# Patient Record
Sex: FEMALE | Race: BLACK OR AFRICAN AMERICAN | Employment: FULL TIME | URBAN - METROPOLITAN AREA
[De-identification: names, ages, dates, MRNs, and addresses within clinical notes are randomized per-mention and may not be internally consistent; named-entity substitution may affect disease eponyms.]

---

## 2017-01-03 ENCOUNTER — TELEPHONE (OUTPATIENT)
Dept: PEDIATRICS | Facility: CLINIC | Age: 32
End: 2017-01-03

## 2017-01-03 NOTE — TELEPHONE ENCOUNTER
Patient fell last night onto concrete injurying her ankle.  Noted large amount of swelling last night.  Has been applying ice and elevating leg, and the swelling has improved slightly, but still has significant swelling today.  Ankle is very painful to the touch and is unable to bear weight on the ankle.  Is asking if she should be seen?  Advised that if she is having this much pain, she should be evaluated.  Patient will come to Urgent care for evaluation today.  No further questions.  JULIANA Miller RN

## 2017-03-21 ENCOUNTER — OFFICE VISIT (OUTPATIENT)
Dept: URGENT CARE | Facility: URGENT CARE | Age: 32
End: 2017-03-21
Payer: COMMERCIAL

## 2017-03-21 VITALS
DIASTOLIC BLOOD PRESSURE: 88 MMHG | TEMPERATURE: 97.6 F | SYSTOLIC BLOOD PRESSURE: 122 MMHG | HEART RATE: 74 BPM | WEIGHT: 170.4 LBS | OXYGEN SATURATION: 100 %

## 2017-03-21 DIAGNOSIS — R11.0 NAUSEA: ICD-10-CM

## 2017-03-21 DIAGNOSIS — R42 LIGHTHEADEDNESS: Primary | ICD-10-CM

## 2017-03-21 DIAGNOSIS — R19.7 DIARRHEA, UNSPECIFIED TYPE: ICD-10-CM

## 2017-03-21 LAB
ALBUMIN SERPL-MCNC: 3.7 G/DL (ref 3.4–5)
ALP SERPL-CCNC: 57 U/L (ref 40–150)
ALT SERPL W P-5'-P-CCNC: 16 U/L (ref 0–50)
ANION GAP SERPL CALCULATED.3IONS-SCNC: 7 MMOL/L (ref 3–14)
AST SERPL W P-5'-P-CCNC: 24 U/L (ref 0–45)
BASOPHILS # BLD AUTO: 0 10E9/L (ref 0–0.2)
BASOPHILS NFR BLD AUTO: 0.5 %
BILIRUB SERPL-MCNC: 0.6 MG/DL (ref 0.2–1.3)
BUN SERPL-MCNC: 12 MG/DL (ref 7–30)
CALCIUM SERPL-MCNC: 9 MG/DL (ref 8.5–10.1)
CHLORIDE SERPL-SCNC: 103 MMOL/L (ref 94–109)
CO2 SERPL-SCNC: 28 MMOL/L (ref 20–32)
CREAT SERPL-MCNC: 1 MG/DL (ref 0.52–1.04)
DIFFERENTIAL METHOD BLD: NORMAL
EOSINOPHIL # BLD AUTO: 0.1 10E9/L (ref 0–0.7)
EOSINOPHIL NFR BLD AUTO: 1 %
ERYTHROCYTE [DISTWIDTH] IN BLOOD BY AUTOMATED COUNT: 11.9 % (ref 10–15)
GFR SERPL CREATININE-BSD FRML MDRD: 66 ML/MIN/1.7M2
GLUCOSE SERPL-MCNC: 87 MG/DL (ref 70–99)
HCT VFR BLD AUTO: 38.9 % (ref 35–47)
HGB BLD-MCNC: 12.8 G/DL (ref 11.7–15.7)
LYMPHOCYTES # BLD AUTO: 3.3 10E9/L (ref 0.8–5.3)
LYMPHOCYTES NFR BLD AUTO: 52 %
MCH RBC QN AUTO: 30.4 PG (ref 26.5–33)
MCHC RBC AUTO-ENTMCNC: 32.9 G/DL (ref 31.5–36.5)
MCV RBC AUTO: 92 FL (ref 78–100)
MONOCYTES # BLD AUTO: 0.5 10E9/L (ref 0–1.3)
MONOCYTES NFR BLD AUTO: 8.1 %
NEUTROPHILS # BLD AUTO: 2.4 10E9/L (ref 1.6–8.3)
NEUTROPHILS NFR BLD AUTO: 38.4 %
PLATELET # BLD AUTO: 286 10E9/L (ref 150–450)
POTASSIUM SERPL-SCNC: 4.7 MMOL/L (ref 3.4–5.3)
PROT SERPL-MCNC: 7.3 G/DL (ref 6.8–8.8)
RBC # BLD AUTO: 4.21 10E12/L (ref 3.8–5.2)
SODIUM SERPL-SCNC: 138 MMOL/L (ref 133–144)
WBC # BLD AUTO: 6.3 10E9/L (ref 4–11)

## 2017-03-21 PROCEDURE — 36415 COLL VENOUS BLD VENIPUNCTURE: CPT | Performed by: PHYSICIAN ASSISTANT

## 2017-03-21 PROCEDURE — 85025 COMPLETE CBC W/AUTO DIFF WBC: CPT | Performed by: PHYSICIAN ASSISTANT

## 2017-03-21 PROCEDURE — 96372 THER/PROPH/DIAG INJ SC/IM: CPT | Performed by: PHYSICIAN ASSISTANT

## 2017-03-21 PROCEDURE — 80053 COMPREHEN METABOLIC PANEL: CPT | Performed by: PHYSICIAN ASSISTANT

## 2017-03-21 PROCEDURE — 99203 OFFICE O/P NEW LOW 30 MIN: CPT | Mod: 25 | Performed by: PHYSICIAN ASSISTANT

## 2017-03-21 NOTE — PROGRESS NOTES
SUBJECTIVE:   Joi Choi is a 31 year old female who complains of new onset positional vertigo since this morning. Has not had this in the past. The patient denies any other symptoms of neurological impairment or TIA's; no amaurosis, diplopia, dysphasia, or unilateral disturbance of motor or sensory function. No headaches currently, though had a typical migraine (left sided) yesterday. No hearing loss or tinnitus, nor head injury. No palpitations or syncope. Healthy in the past. Has had diarrhea today x3.  Soft, nonwatery/explosiveNo blood in stool.    OBJECTIVE:  Appears well, in with mild distress. Vitals normal. Ears normal. Neck supple. No adenopathy or masses in the neck or supraclavicular regions. Cranial nerves are normal. SARAH. EOM's intact. Mental status normal. Gait mildly impairedl. Romberg positive with unidirectional instability (backward). Cerebellar function is normal. Pulse regular. Rapid changes in position during the exam do precipitate brief dizziness without nystagmus.    Results for orders placed or performed in visit on 03/21/17   CBC with platelets and differential   Result Value Ref Range    WBC 6.3 4.0 - 11.0 10e9/L    RBC Count 4.21 3.8 - 5.2 10e12/L    Hemoglobin 12.8 11.7 - 15.7 g/dL    Hematocrit 38.9 35.0 - 47.0 %    MCV 92 78 - 100 fl    MCH 30.4 26.5 - 33.0 pg    MCHC 32.9 31.5 - 36.5 g/dL    RDW 11.9 10.0 - 15.0 %    Platelet Count 286 150 - 450 10e9/L    Diff Method Automated Method     % Neutrophils 38.4 %    % Lymphocytes 52.0 %    % Monocytes 8.1 %    % Eosinophils 1.0 %    % Basophils 0.5 %    Absolute Neutrophil 2.4 1.6 - 8.3 10e9/L    Absolute Lymphocytes 3.3 0.8 - 5.3 10e9/L    Absolute Monocytes 0.5 0.0 - 1.3 10e9/L    Absolute Eosinophils 0.1 0.0 - 0.7 10e9/L    Absolute Basophils 0.0 0.0 - 0.2 10e9/L   Comprehensive metabolic panel (BMP + Alb, Alk Phos, ALT, AST, Total. Bili, TP)   Result Value Ref Range    Sodium 138 133 - 144 mmol/L    Potassium 4.7 3.4 - 5.3  mmol/L    Chloride 103 94 - 109 mmol/L    Carbon Dioxide 28 20 - 32 mmol/L    Anion Gap 7 3 - 14 mmol/L    Glucose 87 70 - 99 mg/dL    Urea Nitrogen 12 7 - 30 mg/dL    Creatinine 1.00 0.52 - 1.04 mg/dL    GFR Estimate 66 >60 mL/min/1.7m2    GFR Estimate If Black 79 >60 mL/min/1.7m2    Calcium 9.0 8.5 - 10.1 mg/dL    Bilirubin Total 0.6 0.2 - 1.3 mg/dL    Albumin 3.7 3.4 - 5.0 g/dL    Protein Total 7.3 6.8 - 8.8 g/dL    Alkaline Phosphatase 57 40 - 150 U/L    ALT 16 0 - 50 U/L    AST 24 0 - 45 U/L         ASSESSMENT:  (R42) Lightheadedness  (primary encounter diagnosis)  Comment: likely vestibular neuronitis  Plan: CBC with platelets and differential,         Comprehensive metabolic panel (BMP + Alb, Alk         Phos, ALT, AST, Total. Bili, TP), CANCELED: UA         with Microscopic reflex to Culture, CANCELED:         Beta HCG qual IFA urine  Orthostatics WNL     The patient is reassured that these symptoms do not appear to represent a serious or threatening condition. This is generally a self-limited temporary but uncomfortable situation. Rest, avoid potentially dangerous activities (such as driving or working with machinery or at heights). Asked to call if develops other symptoms, such as alterations of speech, swallowing, vision, motor or sensory systems, or if dizziness persists or worsens.    (R11.0) Nausea  Comment: Mild  Plan: PROMETHAZINE HCL INJ/50MG, INJECTION         INTRAMUSCULAR OR SUB-Q  Follow up with PCP if symptoms worsen or fail to improve        (R19.7) Diarrhea, unspecified type  Plan: Comprehensive metabolic panel (BMP + Alb, Alk         Phos, ALT, AST, Total. Bili, TP) limited diet, increased fluids in small amounts  Follow up with PCP if symptoms worsen or fail to improve

## 2017-03-21 NOTE — PATIENT INSTRUCTIONS
Follow up with your healthcare provider for further evaluation within the next 7 days or as advised    To Emergency Room with worsening of symptoms      Dizziness (Uncertain Cause)  Dizziness is a common symptom. It may be described as lightheadedness, spinning, or feeling like you are going to faint. Dizziness can have many causes.  Be sure to tell the healthcare provider about:    All medicines you take, including prescription, over-the-counter, herbs, and supplements    Any other symptoms you have    Any health problems you are being treated for    Anything that causes the dizziness to get worse or better  Today's exam did not show an exact cause for your dizziness. Other tests may be needed. Follow up with your healthcare provider.  Home care    Dizziness that occurs with sudden standing may be a sign of mild dehydration. Drink extra fluids for the next few days.    If you recently started a new medicine, stopped a medicine, or had the dose of a current medicine changed, talk with the prescribing healthcare provider. Your medicine plan may need adjustment.    If dizziness lasts more than a few seconds, sit or lie down until it passes. This may help prevent injury in case you pass out.    Do not drive or use power tools or dangerous equipment until you have had no dizziness for at least 48 hours.  Follow-up care  Follow up with your healthcare provider for further evaluation within the next 7 days or as advised.  When to seek medical advice  Call your healthcare provider for any of the following:    Worsening of symptoms or new symptoms    Passing out or seizure    Repeated vomiting    Headache    Palpitations (the sense that your heart is fluttering or beating fast or hard)    Shortness of breath    Blood in vomit or stool (black or red color)    Weakness of an arm or leg or one side of the face    Vision or hearing changes    Trouble walking or speaking    Chest, arm, neck, back, or jaw pain       [id number]  [employer]. [address] [place] [address], [place] [address]. All rights reserved. This information is not intended as a substitute for professional medical care. Always follow your healthcare professional's instructions.

## 2017-03-21 NOTE — NURSING NOTE
The following medication was given:     MEDICATION: Phenergan 25mg  ROUTE: IM  SITE: [facility]  DOSE: 25mg  LOT #: [id number]  :  Graine de Cadeaux  EXPIRATION DATE:  08/2018  NDC#: 7226-6671-08  Edelmira Dickens

## 2017-03-21 NOTE — LETTER
[facility]  [address]  [address]  [place] [address]  Phone: [phone]  Fax: [phone]    March 21, 2017        Joi Choi  [address]   [place] [address]          To whom it may concern:    RE: Joi Choi    Patient was seen and treated today at our clinic and missed work.  Please excuse absences on 3/21 and 3/22/17    Please contact me for questions or concerns.      Sincerely,        Trevor Cartagena PA-C

## 2017-03-21 NOTE — MR AVS SNAPSHOT
After Visit Summary   3/21/2017    Joi Choi    MRN: [id number]           Patient Information     Date Of Birth          1985        Visit Information        Provider Department      3/21/2017 12:15 PM Trevor Cartagena PA-C [facility]        Today's Diagnoses     Lightheadedness    -  1    Nausea          Care Instructions      Follow up with your healthcare provider for further evaluation within the next 7 days or as advised    To Emergency Room with worsening of symptoms      Dizziness (Uncertain Cause)  Dizziness is a common symptom. It may be described as lightheadedness, spinning, or feeling like you are going to faint. Dizziness can have many causes.  Be sure to tell the healthcare provider about:    All medicines you take, including prescription, over-the-counter, herbs, and supplements    Any other symptoms you have    Any health problems you are being treated for    Anything that causes the dizziness to get worse or better  Today's exam did not show an exact cause for your dizziness. Other tests may be needed. Follow up with your healthcare provider.  Home care    Dizziness that occurs with sudden standing may be a sign of mild dehydration. Drink extra fluids for the next few days.    If you recently started a new medicine, stopped a medicine, or had the dose of a current medicine changed, talk with the prescribing healthcare provider. Your medicine plan may need adjustment.    If dizziness lasts more than a few seconds, sit or lie down until it passes. This may help prevent injury in case you pass out.    Do not drive or use power tools or dangerous equipment until you have had no dizziness for at least 48 hours.  Follow-up care  Follow up with your healthcare provider for further evaluation within the next 7 days or as advised.  When to seek medical advice  Call your healthcare provider for any of the following:    Worsening of symptoms or new  "symptoms    Passing out or seizure    Repeated vomiting    Headache    Palpitations (the sense that your heart is fluttering or beating fast or hard)    Shortness of breath    Blood in vomit or stool (black or red color)    Weakness of an arm or leg or one side of the face    Vision or hearing changes    Trouble walking or speaking    Chest, arm, neck, back, or jaw pain       [id number] [employer]. [address] [place] [address]. All rights reserved. This information is not intended as a substitute for professional medical care. Always follow your healthcare professional's instructions.              Follow-ups after your visit        Who to contact     If you have questions or need follow up information about today's clinic visit or your schedule please contact [facility] directly at [phone].  Normal or non-critical lab and imaging results will be communicated to you by earthminehart, letter or phone within 4 business days after the clinic has received the results. If you do not hear from us within 7 days, please contact the clinic through earthminehart or phone. If you have a critical or abnormal lab result, we will notify you by phone as soon as possible.  Submit refill requests through [employer] or call your pharmacy and they will forward the refill request to us. Please allow 3 business days for your refill to be completed.          Additional Information About Your Visit        [employer] Information     [employer] lets you send messages to your doctor, view your test results, renew your prescriptions, schedule appointments and more. To sign up, go to [email or web address] . Click on \"Log in\" on the left side of the screen, which will take you to the Welcome page. Then click on \"Sign up Now\" on the right side of the page.     You will be asked to enter the access code listed below, as well as some personal information. Please follow the directions to create your username and " password.     Your access code is: [email or web address]  Expires: 2017  1:55 PM     Your access code will  in 90 days. If you need help or a new code, please call your [facility] clinic or [phone].        Care EveryWhere ID     This is your Care EveryWhere ID. This could be used by other organizations to access your [facility] medical records  [id number]        Your Vitals Were     Pulse Temperature Last Period Pulse Oximetry          74 97.6  F (36.4  C) (Tympanic) 2017 100%         Blood Pressure from Last 3 Encounters:   17 120/70   16 109/69    Weight from Last 3 Encounters:   17 170 lb 6.4 oz (77.3 kg)              We Performed the Following     Beta HCG qual IFA urine     CBC with platelets and differential     Comprehensive metabolic panel (BMP + Alb, Alk Phos, ALT, AST, Total. Bili, TP)     INJECTION INTRAMUSCULAR OR SUB-Q     PROMETHAZINE HCL INJ/50MG     UA with Microscopic reflex to Culture        Primary Care Provider    None Specified       No primary provider on file.        Thank you!     Thank you for choosing [facility]  for your care. Our goal is always to provide you with excellent care. Hearing back from our patients is one way we can continue to improve our services. Please take a few minutes to complete the written survey that you may receive in the mail after your visit with us. Thank you!             Your Updated Medication List - Protect others around you: Learn how to safely use, store and throw away your medicines at www.disposemymeds.org.          This list is accurate as of: 3/21/17  1:55 PM.  Always use your most recent med list.                   Brand Name Dispense Instructions for use    oxyCODONE 5 MG IR tablet    ROXICODONE    20 tablet    Take 1-2 tablets (5-10 mg) by mouth every 6 hours as needed for pain

## 2017-03-21 NOTE — NURSING NOTE
Chief Complaint   Patient presents with     Urgent Care     Dizziness     Pt states has been dizzy x 1 day, having migraines, and diarrhea all morning. Pt has not taken any otc medications.        Initial /70 (BP Location: Right arm, Patient Position: Chair, Cuff Size: Adult Regular)  Pulse 74  Temp 97.6  F (36.4  C) (Tympanic)  Wt 170 lb 6.4 oz (77.3 kg)  LMP 03/08/2017  SpO2 100% There is no height or weight on file to calculate BMI.  Medication Reconciliation: unable or not appropriate to perform   Tiesha Warner CMA ([facility]) 3/21/2017 12:27 PM

## 2017-11-26 ENCOUNTER — HOSPITAL ENCOUNTER (EMERGENCY)
Facility: CLINIC | Age: 32
Discharge: HOME | End: 2017-11-26
Attending: EMERGENCY MEDICINE | Admitting: EMERGENCY MEDICINE
Payer: COMMERCIAL

## 2017-11-26 ENCOUNTER — APPOINTMENT (OUTPATIENT)
Dept: GENERAL RADIOLOGY | Facility: CLINIC | Age: 32
End: 2017-11-26
Attending: EMERGENCY MEDICINE
Payer: COMMERCIAL

## 2017-11-26 ENCOUNTER — NURSE TRIAGE (OUTPATIENT)
Dept: NURSING | Facility: CLINIC | Age: 32
End: 2017-11-26

## 2017-11-26 VITALS
RESPIRATION RATE: 18 BRPM | TEMPERATURE: 97.8 F | HEART RATE: 109 BPM | OXYGEN SATURATION: 100 % | SYSTOLIC BLOOD PRESSURE: 124 MMHG | DIASTOLIC BLOOD PRESSURE: 83 MMHG
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
## 2017-11-26 DIAGNOSIS — S61.311A LACERATION OF LEFT INDEX FINGER WITHOUT FOREIGN BODY WITH DAMAGE TO NAIL, INITIAL ENCOUNTER: ICD-10-CM

## 2017-11-26 DIAGNOSIS — S61.259A HUMAN BITE OF FINGER, INITIAL ENCOUNTER: ICD-10-CM

## 2017-11-26 DIAGNOSIS — S62.639B OPEN FRACTURE OF DISTAL PHALANX OF DIGIT OF LEFT HAND: ICD-10-CM

## 2017-11-26 PROCEDURE — 12001 RPR S/N/AX/GEN/TRNK 2.5CM/<: CPT

## 2017-11-26 PROCEDURE — 26750 TREAT FINGER FRACTURE EACH: CPT | Mod: F1

## 2017-11-26 PROCEDURE — 73140 X-RAY EXAM OF FINGER(S): CPT | Mod: LT

## 2017-11-26 PROCEDURE — 99283 EMERGENCY DEPT VISIT LOW MDM: CPT | Mod: 25

## 2017-11-26 RX ORDER — BACITRACIN ZINC 500 [USP'U]/G
OINTMENT TOPICAL 2 TIMES DAILY
Qty: 10 G | Refills: 1 | Status: SHIPPED | OUTPATIENT
Start: 2017-11-26 | End: 2019-02-04

## 2017-11-26 RX ORDER — BUPIVACAINE HYDROCHLORIDE 5 MG/ML
INJECTION, SOLUTION PERINEURAL
Status: DISCONTINUED
Start: 2017-11-26 | End: 2017-11-26 | Stop reason: HOSPADM

## 2017-11-26 RX ORDER — TRAMADOL HYDROCHLORIDE 50 MG/1
25-50 TABLET ORAL EVERY 6 HOURS PRN
Qty: 12 TABLET | Refills: 0 | Status: SHIPPED | OUTPATIENT
Start: 2017-11-26 | End: 2019-02-04

## 2017-11-26 RX ORDER — GINSENG 100 MG
CAPSULE ORAL
Status: DISCONTINUED
Start: 2017-11-26 | End: 2017-11-26 | Stop reason: HOSPADM

## 2017-11-26 ASSESSMENT — ENCOUNTER SYMPTOMS: WOUND: 1

## 2017-11-26 NOTE — ED AVS SNAPSHOT
[facility] Emergency Department    [address]    [facility] [address]    Phone:  [phone]    Fax:  [phone]                                       Joi Choi   MRN: [id number]    Department:  [facility] Emergency Department   Date of Visit:  11/26/2017           After Visit Summary Signature Page     I have received my discharge instructions, and my questions have been answered. I have discussed any challenges I see with this plan with the nurse or doctor.    ..........................................................................................................................................  Patient/Patient Representative Signature      ..........................................................................................................................................  Patient Representative Print Name and Relationship to Patient    ..................................................               ................................................  Date                                            Time    ..........................................................................................................................................  Reviewed by Signature/Title    ...................................................              ..............................................  Date                                                            Time

## 2017-11-26 NOTE — DISCHARGE INSTRUCTIONS
Finger Fracture, Open  You have a broken finger (fracture) with a nearby cut, puncture, or deep scrape. This causes local pain, swelling, and bruising. Because of the open injury, you are at risk for infection in the skin and bone. You will take antibiotics to lower the risk for infection.   This injury usually takes about 4 weeks to heal. Finger injuries are often treated with a splint or cast, or by taping the injured finger to the next one (jerome taping). This protects the injured finger and holds the bone in position while it heals. More serious fractures may need surgery.  If the fingernail has been severely injured, it will probably fall off in 1 to 2 weeks. A new fingernail will usually start to grow back within a month.  Home care  Follow these guidelines when caring for yourself at home:    Keep your hand elevated to reduce pain and swelling. When sitting or lying down keep your arm above the level of your heart. You can do this by placing your arm on a pillow that rests on your chest or on a pillow at your side. This is most important during the first 2 days (48 hours) after the injury.    Put an ice pack on the injured area. Do this for 20 minutes every 1 to 2 hours the first day for pain relief. You can make an ice pack by wrapping a plastic bag of ice cubes in a thin towel. As the ice melts, be careful that the cast or splint doesn t get wet. Continue using the ice pack 3 to 4 times a day until the pain and swelling go away.    Keep the cast or splint completely dry at all times. Bathe with your cast or splint out of the water. Protect it with a large plastic bag, rubber-banded at the top end. If a fiberglass cast or splint gets wet, you can dry it with a hair dryer.    You may use acetaminophen or ibuprofen to control pain, unless another pain medicine was prescribed. If you have chronic liver or kidney disease, talk with your healthcare provider before using these medicines. Also talk with your  provider if you ve had a stomach ulcer or gastrointestinal bleeding.    If jerome tape was applied and it becomes wet or dirty, change it. You may replace it with paper, plastic, or cloth tape. Cloth tape and paper tapes must be kept dry. Keep the jerome tape in place for at least 4 weeks.    Take all antibiotics until you have finished them.    Don t put creams or objects under the cast if you have itching.  Follow-up care  Follow up with your healthcare provider, or as advised. This is to make sure the bone is healing the way it should.  X-rays may be taken. You will be told of any new findings that may affect your care.  When to seek medical advice  Call your healthcare provider right away if any of these occur:    The cast or splint cracks    The plaster cast or splint becomes wet or soft    The fiberglass cast or splint stays wet for more than 24 hours    Pain or swelling gets worse    Tightness or pressure under the cast or splint gets worse    Finger becomes cold, blue, numb, or tingly    You can t move your finger    Redness, warmth, swelling, drainage from the wound, or foul odor from a cast or splint    Fever of 100.4 F (38 C) or higher, or as directed by your healthcare provider   Date Last Reviewed: 2/1/2017 2000-2017 [employer] [address] [place] [address]. All rights reserved. This information is not intended as a substitute for professional medical care. Always follow your healthcare professional's instructions.          Human Bites  Human bites can be more serious than animal bites because they often become infected. Many severe human bites occur during fights when a fist strikes someone s teeth. These bites may damage tissue and tendons deep in the hand. Children may bite each other during play or fights.  When to go to the emergency department (ED)  Any human bite that breaks the skin can become infected. There is also the risk of damage to tendons and joints. For  these reasons, seek medical care right away.  What to expect in the ED    The bite will be carefully cleaned and inspected.    X-rays may be done to check for injuries.    Infection can occur from a human bite. Antibiotics may be given to help prevent this. If the wound is already severely infected, you may be admitted to the hospital. There you'll receive antibiotics through a vein in your arm.    For severe tissue or joint damage, especially of the hand, you may be referred to a plastic or orthopedic surgeon.  Follow-up care  Follow-up care is crucial for human bites. Your doctor will check how well you re healing and decide whether you need further treatment.  When to call your healthcare provider  Call your healthcare provider right away if you notice signs of infection including:    Fever over 100.4 F (38.0 C), or higher, or as advised    Increased redness, swelling, or tenderness near the bite    Pus draining from the wound  Date Last Reviewed: 12/1/2016 2000-2017 [employer]. [address] [place] [address], [place] [address]. All rights reserved. This information is not intended as a substitute for professional medical care. Always follow your healthcare professional's instructions.
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
## 2017-11-27 NOTE — TELEPHONE ENCOUNTER
Patient calling, she was prescribed Tramadol in the ER lthis evening. She is asking if it OK to also take Tylenol? Advised that this is OK.  Jessie Ontiveros RN  [facility]

## 2019-02-04 ENCOUNTER — HOSPITAL ENCOUNTER (EMERGENCY)
Facility: CLINIC | Age: 34
Discharge: HOME | End: 2019-02-04
Attending: EMERGENCY MEDICINE | Admitting: EMERGENCY MEDICINE
Payer: COMMERCIAL

## 2019-02-04 ENCOUNTER — APPOINTMENT (OUTPATIENT)
Dept: ULTRASOUND IMAGING | Facility: CLINIC | Age: 34
End: 2019-02-04
Payer: COMMERCIAL

## 2019-02-04 ENCOUNTER — APPOINTMENT (OUTPATIENT)
Dept: GENERAL RADIOLOGY | Facility: CLINIC | Age: 34
End: 2019-02-04
Payer: COMMERCIAL

## 2019-02-04 VITALS
DIASTOLIC BLOOD PRESSURE: 85 MMHG | RESPIRATION RATE: 20 BRPM | SYSTOLIC BLOOD PRESSURE: 132 MMHG | HEART RATE: 55 BPM | TEMPERATURE: 97.6 F | OXYGEN SATURATION: 99 %

## 2019-02-04 DIAGNOSIS — R10.13 ABDOMINAL PAIN, EPIGASTRIC: ICD-10-CM

## 2019-02-04 DIAGNOSIS — N93.9 VAGINAL BLEEDING: ICD-10-CM

## 2019-02-04 LAB
ALBUMIN SERPL-MCNC: 3.5 G/DL (ref 3.4–5)
ALBUMIN UR-MCNC: NEGATIVE MG/DL
ALP SERPL-CCNC: 68 U/L (ref 40–150)
ALT SERPL W P-5'-P-CCNC: 15 U/L (ref 0–50)
ANION GAP SERPL CALCULATED.3IONS-SCNC: 9 MMOL/L (ref 3–14)
APPEARANCE UR: CLEAR
AST SERPL W P-5'-P-CCNC: 17 U/L (ref 0–45)
B-HCG FREE SERPL-ACNC: <5 IU/L
BASOPHILS # BLD AUTO: 0.1 10E9/L (ref 0–0.2)
BASOPHILS NFR BLD AUTO: 1.1 %
BILIRUB SERPL-MCNC: 0.2 MG/DL (ref 0.2–1.3)
BILIRUB UR QL STRIP: NEGATIVE
BUN SERPL-MCNC: 12 MG/DL (ref 7–30)
CALCIUM SERPL-MCNC: 8.8 MG/DL (ref 8.5–10.1)
CHLORIDE SERPL-SCNC: 106 MMOL/L (ref 94–109)
CO2 SERPL-SCNC: 23 MMOL/L (ref 20–32)
COLOR UR AUTO: ABNORMAL
CREAT SERPL-MCNC: 0.69 MG/DL (ref 0.52–1.04)
DIFFERENTIAL METHOD BLD: NORMAL
EOSINOPHIL # BLD AUTO: 0.1 10E9/L (ref 0–0.7)
EOSINOPHIL NFR BLD AUTO: 0.9 %
ERYTHROCYTE [DISTWIDTH] IN BLOOD BY AUTOMATED COUNT: 11.6 % (ref 10–15)
GFR SERPL CREATININE-BSD FRML MDRD: >90 ML/MIN/{1.73_M2}
GLUCOSE SERPL-MCNC: 91 MG/DL (ref 70–99)
GLUCOSE UR STRIP-MCNC: NEGATIVE MG/DL
HCT VFR BLD AUTO: 42 % (ref 35–47)
HGB BLD-MCNC: 13.7 G/DL (ref 11.7–15.7)
HGB UR QL STRIP: ABNORMAL
IMM GRANULOCYTES # BLD: 0 10E9/L (ref 0–0.4)
IMM GRANULOCYTES NFR BLD: 0.4 %
KETONES UR STRIP-MCNC: NEGATIVE MG/DL
LEUKOCYTE ESTERASE UR QL STRIP: NEGATIVE
LIPASE SERPL-CCNC: 170 U/L (ref 73–393)
LYMPHOCYTES # BLD AUTO: 2.1 10E9/L (ref 0.8–5.3)
LYMPHOCYTES NFR BLD AUTO: 37 %
MCH RBC QN AUTO: 29.5 PG (ref 26.5–33)
MCHC RBC AUTO-ENTMCNC: 32.6 G/DL (ref 31.5–36.5)
MCV RBC AUTO: 90 FL (ref 78–100)
MONOCYTES # BLD AUTO: 0.5 10E9/L (ref 0–1.3)
MONOCYTES NFR BLD AUTO: 7.9 %
MUCOUS THREADS #/AREA URNS LPF: PRESENT /LPF
NEUTROPHILS # BLD AUTO: 3 10E9/L (ref 1.6–8.3)
NEUTROPHILS NFR BLD AUTO: 52.7 %
NITRATE UR QL: NEGATIVE
NRBC # BLD AUTO: 0 10*3/UL
NRBC BLD AUTO-RTO: 0 /100
PH UR STRIP: 6 PH (ref 5–7)
PLATELET # BLD AUTO: 339 10E9/L (ref 150–450)
POTASSIUM SERPL-SCNC: 3.7 MMOL/L (ref 3.4–5.3)
PROT SERPL-MCNC: 7.8 G/DL (ref 6.8–8.8)
RBC # BLD AUTO: 4.65 10E12/L (ref 3.8–5.2)
RBC #/AREA URNS AUTO: 1 /HPF (ref 0–2)
SODIUM SERPL-SCNC: 138 MMOL/L (ref 133–144)
SOURCE: ABNORMAL
SP GR UR STRIP: 1.01 (ref 1–1.03)
SPECIMEN SOURCE: NORMAL
SQUAMOUS #/AREA URNS AUTO: 1 /HPF (ref 0–1)
UROBILINOGEN UR STRIP-MCNC: 0 MG/DL (ref 0–2)
WBC # BLD AUTO: 5.7 10E9/L (ref 4–11)
WBC #/AREA URNS AUTO: 4 /HPF (ref 0–5)
WET PREP SPEC: NORMAL

## 2019-02-04 PROCEDURE — 58301 REMOVE INTRAUTERINE DEVICE: CPT

## 2019-02-04 PROCEDURE — 84702 CHORIONIC GONADOTROPIN TEST: CPT

## 2019-02-04 PROCEDURE — 83690 ASSAY OF LIPASE: CPT | Performed by: EMERGENCY MEDICINE

## 2019-02-04 PROCEDURE — 74018 RADEX ABDOMEN 1 VIEW: CPT

## 2019-02-04 PROCEDURE — 96361 HYDRATE IV INFUSION ADD-ON: CPT

## 2019-02-04 PROCEDURE — 81001 URINALYSIS AUTO W/SCOPE: CPT | Performed by: EMERGENCY MEDICINE

## 2019-02-04 PROCEDURE — 80053 COMPREHEN METABOLIC PANEL: CPT | Performed by: EMERGENCY MEDICINE

## 2019-02-04 PROCEDURE — 96374 THER/PROPH/DIAG INJ IV PUSH: CPT

## 2019-02-04 PROCEDURE — 96375 TX/PRO/DX INJ NEW DRUG ADDON: CPT | Mod: 59

## 2019-02-04 PROCEDURE — 87591 N.GONORRHOEAE DNA AMP PROB: CPT | Performed by: EMERGENCY MEDICINE

## 2019-02-04 PROCEDURE — 85025 COMPLETE CBC W/AUTO DIFF WBC: CPT | Performed by: EMERGENCY MEDICINE

## 2019-02-04 PROCEDURE — 99285 EMERGENCY DEPT VISIT HI MDM: CPT | Mod: 25

## 2019-02-04 PROCEDURE — 87210 SMEAR WET MOUNT SALINE/INK: CPT | Performed by: EMERGENCY MEDICINE

## 2019-02-04 PROCEDURE — 25000128 H RX IP 250 OP 636: Performed by: EMERGENCY MEDICINE

## 2019-02-04 PROCEDURE — 93976 VASCULAR STUDY: CPT

## 2019-02-04 PROCEDURE — 87491 CHLMYD TRACH DNA AMP PROBE: CPT | Performed by: EMERGENCY MEDICINE

## 2019-02-04 RX ORDER — MORPHINE SULFATE 4 MG/ML
4 INJECTION, SOLUTION INTRAMUSCULAR; INTRAVENOUS ONCE
Status: COMPLETED | OUTPATIENT
Start: 2019-02-04 | End: 2019-02-04

## 2019-02-04 RX ORDER — PYRIDOXINE HCL (VITAMIN B6) 25 MG
25 TABLET ORAL
COMMUNITY
Start: 2018-06-25

## 2019-02-04 RX ORDER — ONDANSETRON 2 MG/ML
4 INJECTION INTRAMUSCULAR; INTRAVENOUS ONCE
Status: COMPLETED | OUTPATIENT
Start: 2019-02-04 | End: 2019-02-04

## 2019-02-04 RX ORDER — ISONIAZID 300 MG/1
TABLET ORAL
COMMUNITY
Start: 2018-06-25

## 2019-02-04 RX ADMIN — ONDANSETRON 4 MG: 2 INJECTION INTRAMUSCULAR; INTRAVENOUS at 09:48

## 2019-02-04 RX ADMIN — MORPHINE SULFATE 4 MG: 4 INJECTION INTRAVENOUS at 09:47

## 2019-02-04 RX ADMIN — SODIUM CHLORIDE 1000 ML: 9 INJECTION, SOLUTION INTRAVENOUS at 09:48

## 2019-02-04 SDOH — HEALTH STABILITY: MENTAL HEALTH: HOW OFTEN DO YOU HAVE A DRINK CONTAINING ALCOHOL?: NEVER

## 2019-02-04 ASSESSMENT — ENCOUNTER SYMPTOMS
DIARRHEA: 1
ABDOMINAL PAIN: 1
VOMITING: 1

## 2019-02-04 NOTE — ED PROVIDER NOTES
History     Chief Complaint:  Abdominal Pain and Vaginal Bleeding      HPI   Joi Choi is a 33 year old female who presents to the emergency department today for evaluation of abdominal pain and vaginal bleeding. The patient reports she had an IUD placed on 1/25/19. Since then, she has had some vaginal bleeding. She has to change her pad twice a day, and says that this is dark red blood. She reports she has felt some cramping last night, and thought that she was going to get her menstrual cycle. She woke up last night due to sharp in her periumbilical area. Today she does note that she became lightheaded while taking a shower, and has felt like she was going to pass out while at work. She did have an episode of vomiting. Due to these symptoms she presented to the emergency department today. She endorses diarrhea. She denies vaginal discharge.       Allergies:  Ultracet  Lactose        Medications:    The patient is currently on no regular medications.     Past Medical History:    History reviewed. No pertinent past medical history.     Past Surgical History:    History reviewed. No pertinent past surgical history.       Family History:    History reviewed. No pertinent family history.        Social History:  The patient was accompanied to the ED by friend.  Smoking Status: Never Smoker  Marital Status:   [2]       Review of Systems   Gastrointestinal: Positive for abdominal pain (periumbilical), diarrhea and vomiting.   Genitourinary: Positive for vaginal bleeding. Negative for vaginal discharge.   All other systems reviewed and are negative.        Physical Exam     Patient Vitals for the past 24 hrs:   BP Temp Temp src Pulse Heart Rate Resp SpO2   02/04/19 1115 132/85 -- -- 55 -- -- 99 %   02/04/19 1000 129/63 -- -- 62 -- -- 96 %   02/04/19 0915 (!) 146/91 -- -- 62 -- -- 99 %   02/04/19 0914 (!) 136/98 97.6  F (36.4  C) Oral -- 64 20 100 %        Physical Exam  General: The patient is alert, in no  respiratory distress.    HENT: Mucous membranes moist.    Cardiovascular: Regular rate and rhythm. Good pulses in all four extremities. Normal capillary refill and skin turgor.     Respiratory: Lungs are clear. No nasal flaring. No retractions. No wheezing, no crackles.    Gastrointestinal: Abdomen soft. No guarding, no rebound. No palpable hernias. Epigastric tenderness.     Musculoskeletal: No gross deformity.     Skin: No rashes or petechiae.     Neurologic: The patient is alert and oriented x3. GCS 15. No testable cranial nerve deficit. Follows commands with clear and appropriate speech. Gives appropriate answers. Good strength in all extremities. No gross neurologic deficit. Gross sensation intact. Pupils are round and reactive. No meningismus.     Lymphatic: No cervical adenopathy. No lower extremity swelling.    Psychiatric: The patient is non-tearful.   Emergency Department Course     Imaging:  Radiology findings were communicated with the patient who voiced understanding of the findings.    XR Abdomen 2 Views:  IMPRESSION: An IUD is projected over the pelvis. Bowel gas pattern is  nonobstructive. No organomegaly or abnormal calculus  reading per radiology.      US Pelvic Complete w Transvaginal & Abdomen/Pelvis Duplex Limited  IMPRESSION:  Appropriately positioned IUD. Otherwise unremarkable  pelvic ultrasound  Report per radiology      Laboratory:  Laboratory findings were communicated with the patient who voiced understanding of the findings.    CBC: WBC 5.7, HGB 13.7,    CMP: WNL. (Creatinine 0.69)   Lipase: 170     UA: Straw and clear. Urine Blood Moderate, Mucous Urine Present. o/w WNL    HCG qualitative urine POCT:  <5.0      Interventions:  0947 morphine 3 mg IV  0948 NS Bolus 1,000mL IV   0948 Zofran 4mg IV        Emergency Department Course:  Nursing notes and vitals reviewed.  0925: I performed an exam of the patient as documented above.   IV was inserted and blood was drawn for laboratory  testing, results above.   The patient provided a urine sample here in the emergency department. This was sent for laboratory testing, findings above.   The patient was sent for a XR Abdomen, and US Pelvic while in the emergency department, results above.    1303 The patient's IUD was taken out, and we discussed her needing to use other forms of birth control. She is feeling much better.   I personally reviewed the laboratory and imaging results with the Patient and answered all related questions prior to discharge.   Impression & Plan      Medical Decision Making:  Joi Choi is a 33 year old female who reports that since she has had her IUD placed she has been having pain, and bleeding. On her current exam the bleeding appears to be very old. I did consider ectopic pregnancy, however her pregnancy test was negative. She also had some upper abdominal pain, some vomiting, and change in her stools. Therefore GI symptoms are possible. She was adamant she wanted the IUD removed, and I did remove it. We discussed how she needs to use other forms of birth control. Her pain did improve. She otherwise was stable, there are no signs of UTI or perforation of her uterus or migration. She was discharged in good condition with follow up with primary care.     Diagnosis:    ICD-10-CM    1. Abdominal pain, epigastric R10.13    2. Vaginal bleeding N93.9        Disposition:  Discharged to home.      Scribe Disclosure:  I, Heidy Chávez, am serving as a scribe at 9:25 AM on 2/4/2019 to document services personally performed by Malik Russo MD based on my observations and the provider's statements to me.    Heidy Chávez  2/4/2019   [facility] EMERGENCY DEPARTMENT       Malik Russo MD  02/04/19 7412

## 2019-02-04 NOTE — DISCHARGE INSTRUCTIONS
Discharge Instructions  Vaginal Bleeding    You were seen today for unusual vaginal bleeding. Heavy or irregular bleeding may be caused by many different things such as hormone changes, infection, birth control, or cancer. At this time your doctor does not find that your bleeding is a sign of anything dangerous or life-threatening, and you have not lost enough blood to be dangerous.  However, sometimes the signs of serious illness do not show up right away.  If you have new or worse symptoms, you may need to be seen again in the Emergency Department or by your primary doctor.      Return to the Emergency Department if:  You feel lightheaded or faint.  You have a fever of 100.5 degrees or higher.  Your bleeding becomes much heavier than it is now, or if you start passing clots larger than a quarter.  You have severe cramping or abdominal pain.  You have any new or different symptoms.    Treatment:  Motrin , Advil  (ibuprofen) can help relieve cramps and can also decrease bleeding. You may use this according to the directions on the package. Do not use a medicine that you are allergic to, or if your doctor has told you not to use it.  Hormone pills or birth control pills may be used to help control the bleeding. These can cause problems if you have a history of blood clots or stroke, so tell your doctor if you have these problems before you leave.  Iron tablets may be recommended if you have anemia (low blood count.) Iron can cause constipation, so be sure to have plenty of fiber in your diet and let your doctor know if you have problems.  Drinking plenty of fluid is important. Be sure to drink extra water and other healthy drinks, like milk and juice.     Follow-up:  You should be seen by your regular doctor or a gynecologist within 2-3 days, or as directed by your provider here today.  We may have done tests for infection that take time to come back. We should contact you if these show infection that needs  treatment, but be sure to ask your regular doctor to check on them when you are seen.  If you were given a prescription for medicine here today, be sure to read all of the information (including the package insert) that comes with your prescription.  This will include important information about the medicine, its side effects, and any warnings that you need to know about.  The pharmacist who fills the prescription can provide more information and answer questions you may have about the medicine.  If you have questions or concerns that the pharmacist cannot address, please call or return to the Emergency Department.         Opioid Medication Information    Pain medications are among the most commonly prescribed medicines, so we are including this information for all our patients. If you did not receive pain medication or get a prescription for pain medicine, you can ignore it.     You may have been given a prescription for an opioid (narcotic) pain medicine and/or have received a pain medicine while here in the Emergency Department. These medicines can make you drowsy or impaired. You must not drive, operate dangerous equipment, or engage in any other dangerous activities while taking these medications. If you drive while taking these medications, you could be arrested for DUI, or driving under the influence. Do not drink any alcohol while you are taking these medications.     Opioid pain medications can cause addiction. If you have a history of chemical dependency of any type, you are at a higher risk of becoming addicted to pain medications.  Only take these prescribed medications to treat your pain when all other options have been tried. Take it for as short a time and as few doses as possible. Store your pain pills in a secure place, as they are frequently stolen and provide a dangerous opportunity for children or visitors in your house to start abusing these powerful medications. We will not replace any lost or  stolen medicine.  As soon as your pain is better, you should flush all your remaining medication.     Many prescription pain medications contain Tylenol  (acetaminophen), including Vicodin , Tylenol #3 , Norco , Lortab , and Percocet .  You should not take any extra pills of Tylenol  if you are using these prescription medications or you can get very sick.  Do not ever take more than 3000 mg of acetaminophen in any 24 hour period.    All opioids tend to cause constipation. Drink plenty of water and eat foods that have a lot of fiber, such as fruits, vegetables, prune juice, apple juice and high fiber cereal.  Take a laxative if you don?t move your bowels at least every other day. Miralax , Milk of Magnesia, Colace , or Senna  can be used to keep you regular.      Remember that you can always come back to the Emergency Department if you are not able to see your regular doctor in the amount of time listed above, if you get any new symptoms, or if there is anything that worries you.      You need to use another form of birth control now that IUD has been removed.

## 2019-02-04 NOTE — LETTER
February 4, 2019      To Whom It May Concern:      Joi Choi was seen in our Emergency Department today, 02/04/19.  I expect her condition to improve over the next 1-2 days.  She may return to work/school when improved.    Sincerely,        Carmen Rodriguez RN

## 2019-02-04 NOTE — ED NOTES
Patient presents with bilateral abdominal cramping, vaginal bleeding and dizziness with nausea. Patient had an IUD placed on 1/24, and has been having problems with it ever since it was placed. ABCDs intact, alert and oriented x 4.

## 2019-02-04 NOTE — ED NOTES
Patient discharged to home. Patient received follow-up information with OB/GYN in 2 days. Patient received discharge instructions and has no other questions at this time.

## 2019-02-04 NOTE — ED AVS SNAPSHOT
[facility] Emergency Department  [address]  [facility] [address]  Phone:  [phone]  Fax:  [phone]                                    Joi Choi   MRN: [id number]    Department:  [facility] Emergency Department   Date of Visit:  2/4/2019           After Visit Summary Signature Page    I have received my discharge instructions, and my questions have been answered. I have discussed any challenges I see with this plan with the nurse or doctor.    ..........................................................................................................................................  Patient/Patient Representative Signature      ..........................................................................................................................................  Patient Representative Print Name and Relationship to Patient    ..................................................               ................................................  Date                                   Time    ..........................................................................................................................................  Reviewed by Signature/Title    ...................................................              ..............................................  Date                                               Time          22EPIC Rev 08/18

## 2019-02-05 LAB
C TRACH DNA SPEC QL NAA+PROBE: NEGATIVE
N GONORRHOEA DNA SPEC QL NAA+PROBE: NEGATIVE
SPECIMEN SOURCE: NORMAL
SPECIMEN SOURCE: NORMAL

## 2020-04-22 ENCOUNTER — APPOINTMENT (OUTPATIENT)
Dept: GENERAL RADIOLOGY | Facility: CLINIC | Age: 35
End: 2020-04-22
Attending: EMERGENCY MEDICINE
Payer: COMMERCIAL

## 2020-04-22 ENCOUNTER — HOSPITAL ENCOUNTER (EMERGENCY)
Facility: CLINIC | Age: 35
Discharge: HOME | End: 2020-04-22
Attending: EMERGENCY MEDICINE | Admitting: EMERGENCY MEDICINE
Payer: COMMERCIAL

## 2020-04-22 VITALS
DIASTOLIC BLOOD PRESSURE: 81 MMHG | OXYGEN SATURATION: 100 % | SYSTOLIC BLOOD PRESSURE: 138 MMHG | RESPIRATION RATE: 18 BRPM | TEMPERATURE: 98.9 F | HEART RATE: 86 BPM

## 2020-04-22 DIAGNOSIS — R07.9 CHEST PAIN, UNSPECIFIED TYPE: ICD-10-CM

## 2020-04-22 DIAGNOSIS — Z20.822 SUSPECTED COVID-19 VIRUS INFECTION: ICD-10-CM

## 2020-04-22 LAB
ANION GAP SERPL CALCULATED.3IONS-SCNC: 4 MMOL/L (ref 3–14)
BASOPHILS # BLD AUTO: 0.1 10E9/L (ref 0–0.2)
BASOPHILS NFR BLD AUTO: 0.8 %
BUN SERPL-MCNC: 10 MG/DL (ref 7–30)
CALCIUM SERPL-MCNC: 8.7 MG/DL (ref 8.5–10.1)
CHLORIDE SERPL-SCNC: 106 MMOL/L (ref 94–109)
CO2 SERPL-SCNC: 26 MMOL/L (ref 20–32)
CREAT SERPL-MCNC: 0.77 MG/DL (ref 0.52–1.04)
DIFFERENTIAL METHOD BLD: NORMAL
EOSINOPHIL # BLD AUTO: 0.1 10E9/L (ref 0–0.7)
EOSINOPHIL NFR BLD AUTO: 1.5 %
ERYTHROCYTE [DISTWIDTH] IN BLOOD BY AUTOMATED COUNT: 11.7 % (ref 10–15)
GFR SERPL CREATININE-BSD FRML MDRD: >90 ML/MIN/{1.73_M2}
GLUCOSE SERPL-MCNC: 96 MG/DL (ref 70–99)
HCG SERPL QL: NEGATIVE
HCT VFR BLD AUTO: 38.7 % (ref 35–47)
HGB BLD-MCNC: 12.6 G/DL (ref 11.7–15.7)
IMM GRANULOCYTES # BLD: 0 10E9/L (ref 0–0.4)
IMM GRANULOCYTES NFR BLD: 0.2 %
LYMPHOCYTES # BLD AUTO: 3.5 10E9/L (ref 0.8–5.3)
LYMPHOCYTES NFR BLD AUTO: 40.9 %
MCH RBC QN AUTO: 29.9 PG (ref 26.5–33)
MCHC RBC AUTO-ENTMCNC: 32.6 G/DL (ref 31.5–36.5)
MCV RBC AUTO: 92 FL (ref 78–100)
MONOCYTES # BLD AUTO: 0.7 10E9/L (ref 0–1.3)
MONOCYTES NFR BLD AUTO: 8.5 %
NEUTROPHILS # BLD AUTO: 4.1 10E9/L (ref 1.6–8.3)
NEUTROPHILS NFR BLD AUTO: 48.1 %
NRBC # BLD AUTO: 0 10*3/UL
NRBC BLD AUTO-RTO: 0 /100
PLATELET # BLD AUTO: 328 10E9/L (ref 150–450)
POTASSIUM SERPL-SCNC: 3.9 MMOL/L (ref 3.4–5.3)
RBC # BLD AUTO: 4.22 10E12/L (ref 3.8–5.2)
SODIUM SERPL-SCNC: 136 MMOL/L (ref 133–144)
WBC # BLD AUTO: 8.6 10E9/L (ref 4–11)

## 2020-04-22 PROCEDURE — 84703 CHORIONIC GONADOTROPIN ASSAY: CPT | Performed by: EMERGENCY MEDICINE

## 2020-04-22 PROCEDURE — 85025 COMPLETE CBC W/AUTO DIFF WBC: CPT | Performed by: EMERGENCY MEDICINE

## 2020-04-22 PROCEDURE — 80048 BASIC METABOLIC PNL TOTAL CA: CPT | Performed by: EMERGENCY MEDICINE

## 2020-04-22 PROCEDURE — 93005 ELECTROCARDIOGRAM TRACING: CPT

## 2020-04-22 PROCEDURE — 99285 EMERGENCY DEPT VISIT HI MDM: CPT | Mod: 25

## 2020-04-22 PROCEDURE — 71045 X-RAY EXAM CHEST 1 VIEW: CPT

## 2020-04-22 RX ORDER — ALBUTEROL SULFATE 90 UG/1
2 AEROSOL, METERED RESPIRATORY (INHALATION) EVERY 6 HOURS PRN
Qty: 1 INHALER | Refills: 0 | Status: SHIPPED | OUTPATIENT
Start: 2020-04-22

## 2020-04-22 ASSESSMENT — ENCOUNTER SYMPTOMS
LIGHT-HEADEDNESS: 1
FEVER: 1
SHORTNESS OF BREATH: 1
COUGH: 1
DIAPHORESIS: 1

## 2020-04-22 NOTE — ED AVS SNAPSHOT
[facility] Emergency Department  [address]  [facility] [address]  Phone:  [phone]  Fax:  [phone]                                    Joi Choi   MRN: [id number]    Department:  [facility] Emergency Department   Date of Visit:  4/22/2020           After Visit Summary Signature Page    I have received my discharge instructions, and my questions have been answered. I have discussed any challenges I see with this plan with the nurse or doctor.    ..........................................................................................................................................  Patient/Patient Representative Signature      ..........................................................................................................................................  Patient Representative Print Name and Relationship to Patient    ..................................................               ................................................  Date                                   Time    ..........................................................................................................................................  Reviewed by Signature/Title    ...................................................              ..............................................  Date                                               Time          22EPIC Rev 08/18

## 2020-04-23 LAB — INTERPRETATION ECG - MUSE: NORMAL

## 2020-04-23 NOTE — ED TRIAGE NOTES
Pt reports cough and fevers up to 104 at home. Chest pain since this weekend, also increased SOB.  ABCs intact, A&Ox4

## 2020-04-23 NOTE — ED PROVIDER NOTES
History     Chief Complaint:  Cough      HPI   Joi Choi is a 34 year old female who presents with cough. She traveled to [place] in early March and felt unwell when she returned home. Her symptoms of cough, diarrhea, fevers, and shortness of breath largely resolved before her dry cough returned last week. She went for a walk on Friday and was short of breath. She later began sweating, felt nauseous, and passed out. Her fever started on  and has continued since that time. On Monday, she noticed chest pain, which has been consistent in nature, localized to the center of her chest, worsened by movement, and accompanied by a feeling of lightheadedness. She endorses a family history of asthma. She denies noticing swelling in her legs or a history of smoking.    Allergies:  Ultracet  Lactose     Medications:    Nydrazid  Levonorgestrel IUD  Priftin  Pyridoxine  Spironolactone    Past Medical History:    Latent tuberculosis    Past Surgical History:    Therapeutic     Family History:    Asthma (brothers)  Fibroids (mother)  Gout (father)  Hypertension (father)    Social History:  Smoking status: Never  Alcohol use: Never  Drug use: No  PCP: Physician No Ref-Primary  Marital Status:   [2]      Review of Systems   Constitutional: Positive for diaphoresis and fever.   Respiratory: Positive for cough and shortness of breath.    Cardiovascular: Positive for chest pain. Negative for leg swelling.   Neurological: Positive for light-headedness.   All other systems reviewed and are negative.       Physical Exam     Patient Vitals for the past 24 hrs:   BP Temp Temp src Pulse Heart Rate Resp SpO2   20 2200 138/81 -- -- 86 -- -- --   20 116/65 -- -- 78 -- -- 100 %   20 -- -- -- -- -- -- 100 %   20 128/65 -- -- 78 -- -- --   20 (!) 132/93 98.9  F (37.2  C) Oral -- 74 18 100 %         Physical Exam  Constitutional: Alert, attentive, GCS 15  HENT:    Nose:  Nose normal.    Mouth/Throat: Oropharynx is clear, mucous membranes are moist.  Eyes: EOM are normal, anicteric, conjugate gaze  CV: regular rate and rhythm; no murmurs  Chest: Effort normal and breath sounds clear without wheezing or rales, symmetric bilaterally. Dry cough.  GI:  non tender. No distension. No guarding or rebound.    MSK: No LE edema, no tenderness to palpation of BLE.  Neurological: Alert, attentive, moving all extremities equally.   Skin: Skin is warm and dry.      Emergency Department Course   ECG (21:55:24):  Rate 96 bpm. KS interval 178. QRS duration 76. QT/QTc 338/427. P-R-T axes 60 55 53. Sinus rhythm with sinus arrhythmia. Possible Left atrial enlargement. Borderline ECG. Interpreted at 2224 by Jagdish Villaseñor MD.    Imaging:  Radiographic findings were communicated with the patient who voiced understanding of the findings.  Chest XR Port 1 view:  Negative chest.  As read by radiology.     Laboratory:  CBC: WNL (WBC 8.6, HGB 12.6, )  BMP: WNL (Creatinine 0.77)  HCG Quantitative: negative    Procedures: None.    Emergency Department Course:  Past medical records, nursing notes, and vitals reviewed.  : I performed an exam of the patient and obtained history, as documented above.    The patient was sent for a chest XR while in the emergency department, findings above.  EKG performed, results above.  IV inserted and blood drawn.    230: I rechecked the patient. Findings and plan explained to the patient. Patient discharged home with instructions regarding supportive care, medications, and reasons to return. The importance of close follow-up was reviewed.     Impression & Plan    Medical Decision Makin-year-old woman with no significant past medical history presenting for evaluation of reported fevers and cough.  She reports returning from [place] 1 month prior and had 8 days of fever, cough, diarrhea and shortness of breath that resolved after quarantining though she was  not tested for coronavirus and had people who traveled with her that were sick with similar symptoms.  Her cough has now returned as she reports her fever has as well however, on arrival here she is afebrile without antipyresis, sats are 100% heart rate is normal.  On auscultation, she does sound slightly tight with air movement and there is a strong family history of asthma though none her.  Her chest x-ray here is clear without evidence of pneumonia including multi focal airspace opacities as well as no pneumothorax.  She is PERC negative.  EKG without evidence of ischemic changes with ongoing pain.  She does have some mild chest discomfort with arm abduction against resistance, suggestive of possible musculoskeletal etiology though no pain with palpation.  Given her current constellation of symptoms, certainly cannot exclude COVID-19 however she does not meet criteria for testing per the Department of Health.  She did have a syncopal event several days prior to this had clear prodrome including nausea and sweating and I suspect likely neurogenic/vasovagal syncope as the etiology.  Do not feel she warrants invasive at this time, I recommended return to home isolation given we cannot exclude whether she had coronavirus 1 month prior or currently.  This was reviewed, I will give her an albuterol inhaler to see if this helps with her dry cough.  Return precautions were reviewed and patient was discharged home.  Critical Care time:  none    Diagnosis:    ICD-10-CM    1. Suspected Covid-19 Virus Infection  Z20.828    2. Chest pain, unspecified type  R07.9        Disposition: discharged to home with Albuterol inhaler    Discharge Medications:  New Prescriptions    ALBUTEROL (PROAIR HFA/PROVENTIL HFA/VENTOLIN HFA) 108 (90 BASE) MCG/ACT INHALER    Inhale 2 puffs into the lungs every 6 hours as needed for shortness of breath / dyspnea or wheezing     Jagdish Villaseñor MD  [employer]  11:33  PM 04/22/20     I, Rizwan Mandujano, am serving as a scribe at 10:52 PM on 4/22/2020 to document services personally performed by Jagdish Villaseñor MD based on my observations and the provider's statements to me.     Rizwan Mandujano  4/22/2020   [facility] EMERGENCY DEPARTMENT       Jagdish Villaseñor MD  04/22/20 [id number]

## 2021-09-23 ENCOUNTER — OFFICE VISIT (OUTPATIENT)
Dept: URGENT CARE | Facility: URGENT CARE | Age: 36
End: 2021-09-23
Payer: COMMERCIAL

## 2021-09-23 ENCOUNTER — HOSPITAL ENCOUNTER (EMERGENCY)
Facility: CLINIC | Age: 36
Discharge: HOME | End: 2021-09-23
Attending: EMERGENCY MEDICINE | Admitting: EMERGENCY MEDICINE
Payer: COMMERCIAL

## 2021-09-23 VITALS
TEMPERATURE: 98 F | HEART RATE: 78 BPM | SYSTOLIC BLOOD PRESSURE: 138 MMHG | OXYGEN SATURATION: 98 % | RESPIRATION RATE: 20 BRPM | DIASTOLIC BLOOD PRESSURE: 78 MMHG

## 2021-09-23 VITALS
RESPIRATION RATE: 18 BRPM | TEMPERATURE: 98.3 F | HEART RATE: 71 BPM | DIASTOLIC BLOOD PRESSURE: 82 MMHG | SYSTOLIC BLOOD PRESSURE: 113 MMHG | OXYGEN SATURATION: 98 %

## 2021-09-23 DIAGNOSIS — R42 VERTIGO: ICD-10-CM

## 2021-09-23 DIAGNOSIS — R55 SYNCOPE, UNSPECIFIED SYNCOPE TYPE: Primary | ICD-10-CM

## 2021-09-23 LAB
ANION GAP SERPL CALCULATED.3IONS-SCNC: 7 MMOL/L (ref 3–14)
B-HCG FREE SERPL-ACNC: <5 IU/L (ref 0–5)
BASOPHILS # BLD AUTO: 0.1 10E3/UL (ref 0–0.2)
BASOPHILS NFR BLD AUTO: 1 %
BUN SERPL-MCNC: 11 MG/DL (ref 7–30)
CALCIUM SERPL-MCNC: 8.5 MG/DL (ref 8.5–10.1)
CHLORIDE BLD-SCNC: 108 MMOL/L (ref 94–109)
CO2 SERPL-SCNC: 24 MMOL/L (ref 20–32)
CREAT SERPL-MCNC: 0.82 MG/DL (ref 0.52–1.04)
EOSINOPHIL # BLD AUTO: 0.1 10E3/UL (ref 0–0.7)
EOSINOPHIL NFR BLD AUTO: 1 %
ERYTHROCYTE [DISTWIDTH] IN BLOOD BY AUTOMATED COUNT: 12.1 % (ref 10–15)
GFR SERPL CREATININE-BSD FRML MDRD: >90 ML/MIN/1.73M2
GLUCOSE BLD-MCNC: 83 MG/DL (ref 70–99)
HCT VFR BLD AUTO: 37.1 % (ref 35–47)
HGB BLD-MCNC: 12 G/DL (ref 11.7–15.7)
HOLD SPECIMEN: NORMAL
HOLD SPECIMEN: NORMAL
IMM GRANULOCYTES # BLD: 0 10E3/UL
IMM GRANULOCYTES NFR BLD: 0 %
LYMPHOCYTES # BLD AUTO: 4.1 10E3/UL (ref 0.8–5.3)
LYMPHOCYTES NFR BLD AUTO: 63 %
MCH RBC QN AUTO: 29.4 PG (ref 26.5–33)
MCHC RBC AUTO-ENTMCNC: 32.3 G/DL (ref 31.5–36.5)
MCV RBC AUTO: 91 FL (ref 78–100)
MONOCYTES # BLD AUTO: 0.5 10E3/UL (ref 0–1.3)
MONOCYTES NFR BLD AUTO: 8 %
NEUTROPHILS # BLD AUTO: 1.7 10E3/UL (ref 1.6–8.3)
NEUTROPHILS NFR BLD AUTO: 27 %
NRBC # BLD AUTO: 0 10E3/UL
NRBC BLD AUTO-RTO: 0 /100
PLATELET # BLD AUTO: 283 10E3/UL (ref 150–450)
POTASSIUM BLD-SCNC: 3.4 MMOL/L (ref 3.4–5.3)
RBC # BLD AUTO: 4.08 10E6/UL (ref 3.8–5.2)
SODIUM SERPL-SCNC: 139 MMOL/L (ref 133–144)
TROPONIN I SERPL-MCNC: <0.015 UG/L (ref 0–0.04)
WBC # BLD AUTO: 6.4 10E3/UL (ref 4–11)

## 2021-09-23 PROCEDURE — 84484 ASSAY OF TROPONIN QUANT: CPT | Performed by: EMERGENCY MEDICINE

## 2021-09-23 PROCEDURE — 250N000013 HC RX MED GY IP 250 OP 250 PS 637: Performed by: EMERGENCY MEDICINE

## 2021-09-23 PROCEDURE — 80048 BASIC METABOLIC PNL TOTAL CA: CPT | Performed by: EMERGENCY MEDICINE

## 2021-09-23 PROCEDURE — 36415 COLL VENOUS BLD VENIPUNCTURE: CPT | Performed by: EMERGENCY MEDICINE

## 2021-09-23 PROCEDURE — 96360 HYDRATION IV INFUSION INIT: CPT

## 2021-09-23 PROCEDURE — 85025 COMPLETE CBC W/AUTO DIFF WBC: CPT | Performed by: EMERGENCY MEDICINE

## 2021-09-23 PROCEDURE — 93005 ELECTROCARDIOGRAM TRACING: CPT

## 2021-09-23 PROCEDURE — 99284 EMERGENCY DEPT VISIT MOD MDM: CPT | Mod: 25

## 2021-09-23 PROCEDURE — 84702 CHORIONIC GONADOTROPIN TEST: CPT

## 2021-09-23 PROCEDURE — 99207 PR NON-BILLABLE SERV PER CHARTING: CPT | Performed by: FAMILY MEDICINE

## 2021-09-23 PROCEDURE — 96361 HYDRATE IV INFUSION ADD-ON: CPT

## 2021-09-23 PROCEDURE — 258N000003 HC RX IP 258 OP 636: Performed by: EMERGENCY MEDICINE

## 2021-09-23 RX ORDER — MECLIZINE HCL 12.5 MG 12.5 MG/1
25 TABLET ORAL 3 TIMES DAILY PRN
Qty: 30 TABLET | Refills: 0 | Status: SHIPPED | OUTPATIENT
Start: 2021-09-23

## 2021-09-23 RX ORDER — SODIUM CHLORIDE 9 MG/ML
INJECTION, SOLUTION INTRAVENOUS CONTINUOUS
Status: DISCONTINUED | OUTPATIENT
Start: 2021-09-23 | End: 2021-09-23 | Stop reason: HOSPADM

## 2021-09-23 RX ORDER — MECLIZINE HYDROCHLORIDE 25 MG/1
25 TABLET ORAL ONCE
Status: COMPLETED | OUTPATIENT
Start: 2021-09-23 | End: 2021-09-23

## 2021-09-23 RX ADMIN — SODIUM CHLORIDE 1000 ML: 9 INJECTION, SOLUTION INTRAVENOUS at 15:23

## 2021-09-23 RX ADMIN — MECLIZINE HYDROCHLORIDE 25 MG: 25 TABLET ORAL at 15:33

## 2021-09-23 ASSESSMENT — ENCOUNTER SYMPTOMS
DIZZINESS: 1
DIARRHEA: 0
LIGHT-HEADEDNESS: 1
VOMITING: 0

## 2021-09-23 NOTE — PROGRESS NOTES
Unwitnessed syncopal episode X 2 today per patient.  Endorsed dizziness.  Requires ER evaluation for syncopal events.    No charge.  Laurent Evans MD  September 23, 2021 2:30 PM
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
## 2021-09-23 NOTE — ED TRIAGE NOTES
Pt presents to ED with c/o dizziness and near syncope. States she had two episodes today where she felt like she was going to black out and felt she couldn't feel her arms or legs. Hx of some intermittent vertigo, but nothing that has concerned about. Also reports 2 months of random blurry vision that lasts only moments and goes away. C/O slight headache which is not new for her.

## 2021-09-23 NOTE — LETTER
Joi Choi was seen and treated in our emergency department on 9/23/2021.  She may return to work on 09/25/2021    If you have any questions or concerns, please don't hesitate to call.      Semaj ULLOA RN

## 2021-09-24 LAB
ATRIAL RATE - MUSE: 58 BPM
DIASTOLIC BLOOD PRESSURE - MUSE: NORMAL MMHG
INTERPRETATION ECG - MUSE: NORMAL
P AXIS - MUSE: 23 DEGREES
PR INTERVAL - MUSE: 192 MS
QRS DURATION - MUSE: 84 MS
QT - MUSE: 396 MS
QTC - MUSE: 388 MS
R AXIS - MUSE: 46 DEGREES
SYSTOLIC BLOOD PRESSURE - MUSE: NORMAL MMHG
T AXIS - MUSE: 45 DEGREES
VENTRICULAR RATE- MUSE: 58 BPM